# Patient Record
(demographics unavailable — no encounter records)

---

## 2025-06-02 NOTE — REASON FOR VISIT
[CV Risk Factors and Non-Cardiac Disease] : CV risk factors and non-cardiac disease [FreeTextEntry3] : Dr. Deni Raymundo  [FreeTextEntry1] : 20 year old male with PMH murmur presents for a cardiac consultation for evaluation of atypical chest pains, referred by his PCP Dr. Palacio.  Patient reports feeling daily chest pains not related to exertional activity for 2 weeks. Patient reports the chest pain feels like a soreness that is generalized over the full upper torso, not worsened with physical activity. Patient reports riding stationary bike for about 30-40 minutes Feb-May/2025, continues with daily push ups and pull ups, denies experiencing chest pains during exercise or dyspnea. Pain is not reproducible to touch. Reports playing sports as a child in school, denies experiencing chest pains or dyspnea during that time.  Denies current or past tobacco use or alcohol use.  Denies surgical history.   Reports being diagnosed with a murmur since a baby.  Family history: dad- "problem with heart", paternal grandfather- MI age 67, paternal uncle  age 6 from a heart issue, maternal grandfather- CAD s/p stent placements.   EKG 25- sinus bradycardia, rate 58 bpm, occ PAC. within normal limits. Plan: conservative management- heating pad applied to anterior chest wall for 20 minutes twice daily.

## 2025-06-02 NOTE — DISCUSSION/SUMMARY
[FreeTextEntry1] : This is a 20-year-old male with no significant past medical history, who comes in for cardiac consultation.  The patient was well until a few weeks ago when he complained of chest wall pain across his entire chest not related to any particular activity.  The discomfort gradually subsided over 2-weeks.  He describes it as a generalized soreness.  Of note he does daily push-ups and pull-ups. the discomfort has resolved 80 to 90%.  He denies shortness of breath, palpitations, dizziness or syncope. He has no history of rheumatic fever.  He does not drink excessive caffeine or alcohol. He has no known cardiac risk factors. Electrocardiogram done 2025, demonstrated sinus bradycardia rate of 58 bpm is otherwise remarkable for 1 atrial premature contraction. Patient reports riding stationary bike for about 30-40 minutes Feb-May/2025, continues with daily pushups and pull ups. The pain is not reproducible to touch. Denies surgical history.   Reports being diagnosed with a murmur since a baby.  Family history: dad- "problem with heart", paternal grandfather- MI age 67, paternal uncle  age 6 from a heart issue, maternal grandfather- CAD s/p stent placements.  This patient's pain is most likely musculoskeletal in nature from his daily exercise routine.  It could also represent costochondritis.  Either way it is 80 to 90% improved.  I recommend that he use a heating pad for 20 minutes twice a day.  If his symptoms return or increase I would recommend the use of nonsteroidal anti-inflammatory medications. For now conservative therapy and observation is warranted. The patient understands that aerobic exercises must be increased to 40 minutes 4 times per week. A detailed discussion of lifestyle modification was done today. The patient has a good understanding of the diagnosis, and treatment plan. Lifestyle modification was also outlined.  Thank you for allow me to participate in the care of your patient.  Please do not hesitate to call if you have any questions.

## 2025-06-02 NOTE — PHYSICAL EXAM
